# Patient Record
Sex: FEMALE | Race: BLACK OR AFRICAN AMERICAN | NOT HISPANIC OR LATINO | URBAN - METROPOLITAN AREA
[De-identification: names, ages, dates, MRNs, and addresses within clinical notes are randomized per-mention and may not be internally consistent; named-entity substitution may affect disease eponyms.]

---

## 2017-01-24 NOTE — PATIENT DISCUSSION
1.  Dermatochalasis OU:  Patient currently asymptomatic. Observe. 2. ARMD OU dry - Importance of smoking cessation blood pressure control and healthy diet were emphasized. In accordance with the AREDS study a good multivitamin containing EC and Zinc were recommened to be taken daily. Patient was instructed to self monitor their monocular vision (reading/Amsler Grid) at least weekly. Patient should immediately report any new onset of decreased vision or metamorphopsia. Continue Preservision 2 tabs/day. 3. Return for an appointment in 1 year for comprehensive exam. OCT Macula. with Dr. Giuliana Pond.

## 2017-08-02 ENCOUNTER — IMPORTED ENCOUNTER (OUTPATIENT)
Dept: URBAN - METROPOLITAN AREA CLINIC 9 | Facility: CLINIC | Age: 52
End: 2017-08-02

## 2018-01-29 NOTE — PATIENT DISCUSSION
1.  ARMD OU dry - Importance of smoking cessation blood pressure control and healthy diet were emphasized. In accordance with the AREDS study a good multivitamin containing EC and Zinc were recommened to be taken daily. Patient was instructed to self monitor their monocular vision (reading/Amsler Grid) at least weekly. Patient should immediately report any new onset of decreased vision or metamorphopsia. 2. Pseudophakia OU - IOLs stable. Monitor. 3. No glasses change. 4.  Return for an appointment in 1 year for comprehensive exam. Fundus Photo Macula. with Dr. Chari Giron.

## 2018-09-05 ENCOUNTER — IMPORTED ENCOUNTER (OUTPATIENT)
Dept: URBAN - METROPOLITAN AREA CLINIC 9 | Facility: CLINIC | Age: 53
End: 2018-09-05

## 2018-11-07 ENCOUNTER — IMPORTED ENCOUNTER (OUTPATIENT)
Dept: URBAN - METROPOLITAN AREA CLINIC 9 | Facility: CLINIC | Age: 53
End: 2018-11-07

## 2018-11-20 ENCOUNTER — IMPORTED ENCOUNTER (OUTPATIENT)
Dept: URBAN - METROPOLITAN AREA CLINIC 9 | Facility: CLINIC | Age: 53
End: 2018-11-20

## 2019-02-04 NOTE — PATIENT DISCUSSION
1.  ARMD OU dry - stable follow up with Dr. Anitra Carmichael. 2. Pseudophakia OU - IOLs stable. Monitor. 3. PVD OU:  Patient was cautioned to call our office immediately if they experience a substantial change in their symptoms such as an increase in floaters persistent flashes loss of visual field (may appear as a shadow or a curtain) or decrease in visual acuity as these may indicate a retinal tear or detachment. If this is a new problem patient will need to return for re-examination  as determined by the [address] [place] [address]. Refractive error - Glasses change optional. 5.  Return for an appointment in 1 year for comprehensive exam. and Optos with Dr. Dario Anthony.

## 2019-09-23 ENCOUNTER — IMPORTED ENCOUNTER (OUTPATIENT)
Dept: URBAN - METROPOLITAN AREA CLINIC 9 | Facility: CLINIC | Age: 54
End: 2019-09-23

## 2019-10-22 NOTE — PATIENT DISCUSSION
If no improvement after 2 weeks, Patient instructed to see DR Darryn Alvarenga for further evaluation.

## 2020-01-06 ENCOUNTER — IMPORTED ENCOUNTER (OUTPATIENT)
Dept: URBAN - METROPOLITAN AREA CLINIC 9 | Facility: CLINIC | Age: 55
End: 2020-01-06

## 2020-02-04 NOTE — PATIENT DISCUSSION
1.  ARMD OU dry - stable follow up with Dr. Sinai Serna as scheduled. 2. Pseudophakia OU - IOLs stable. Monitor. 3. PVD OU:  monitor. 4. Refractive error - Glasses change optional. 5.  Return for an appointment in 1 year for comprehensive exam and Optos with Dr. Giuliana Pond.

## 2020-09-21 ENCOUNTER — IMPORTED ENCOUNTER (OUTPATIENT)
Dept: URBAN - METROPOLITAN AREA CLINIC 9 | Facility: CLINIC | Age: 55
End: 2020-09-21

## 2021-02-03 ENCOUNTER — IMPORTED ENCOUNTER (OUTPATIENT)
Dept: URBAN - METROPOLITAN AREA CLINIC 9 | Facility: CLINIC | Age: 56
End: 2021-02-03

## 2021-02-09 NOTE — PATIENT DISCUSSION
1.  ARMD OU dry - stable follow up with Dr. Yesy Burris as scheduled. 2. Pseudophakia OU - IOLs stable. Monitor. 3. PVD OU:  monitor. 4. Refractive error - Glasses change optional. 5.  Return for an appointment in 1 year for comprehensive exam and Optos with Dr. Omer Vasquez.

## 2021-09-15 ENCOUNTER — IMPORTED ENCOUNTER (OUTPATIENT)
Dept: URBAN - METROPOLITAN AREA CLINIC 9 | Facility: CLINIC | Age: 56
End: 2021-09-15

## 2021-09-15 PROBLEM — H34.8322: Noted: 2021-09-15

## 2021-10-17 ASSESSMENT — TONOMETRY
OD_IOP_MMHG: 15
OD_IOP_MMHG: 12
OS_IOP_MMHG: 16
OD_IOP_MMHG: 13
OD_IOP_MMHG: 12
OS_IOP_MMHG: 10
OS_IOP_MMHG: 12
OS_IOP_MMHG: 13
OS_IOP_MMHG: 19
OS_IOP_MMHG: 9
OS_IOP_MMHG: 15
OD_IOP_MMHG: 18
OD_IOP_MMHG: 9
OS_IOP_MMHG: 16
OS_IOP_MMHG: 12
OD_IOP_MMHG: 12
OD_IOP_MMHG: 12

## 2021-10-17 ASSESSMENT — KERATOMETRY
OD_K1POWER_DIOPTERS: 44
OD_AXISANGLE2_DEGREES: 90
OS_AXISANGLE_DEGREES: 1
OD_K2POWER_DIOPTERS: 45.5
OS_AXISANGLE_DEGREES: 5
OD_K2POWER_DIOPTERS: 45.5
OS_AXISANGLE_DEGREES: 2
OD_AXISANGLE_DEGREES: 177
OD_AXISANGLE_DEGREES: 180
OS_K1POWER_DIOPTERS: 43.5
OS_K1POWER_DIOPTERS: 43.5
OS_K2POWER_DIOPTERS: 45.25
OD_K1POWER_DIOPTERS: 43.75
OS_AXISANGLE2_DEGREES: 92
OD_AXISANGLE_DEGREES: 7
OS_AXISANGLE2_DEGREES: 91
OD_AXISANGLE2_DEGREES: 87
OS_K2POWER_DIOPTERS: 45.5
OD_K1POWER_DIOPTERS: 44
OD_K2POWER_DIOPTERS: 45.75
OD_AXISANGLE2_DEGREES: 97
OS_AXISANGLE2_DEGREES: 95
OS_K1POWER_DIOPTERS: 43.5
OS_K2POWER_DIOPTERS: 45.25

## 2021-10-17 ASSESSMENT — VISUAL ACUITY
OS_CC: 20/30 SN
OS_CC: 20/40 SN
OS_SC: 20/25 - SN
OD_SC: 20/30 -2 SN
OD_CC: 20/25 SN
OS_CC: 20/25 - SN
OS_CC: 20/20 SN
OD_CC: 20/20 SN
OS_CC: 20/20 SN
OS_CC: 20/40 SN
OD_CC: 20/20 -2 SN
OS_CC: 20/20 - SN
OS_CC: 20/25 -2 SN
OD_CC: 20/20 -2 SN
OD_CC: 20/20 SN
OS_CC: 20/25 - SN
OS_CC: 20/20 - SN
OD_CC: 20/20 -2 SN
OD_CC: 20/20 -2 SN
OD_CC: 20/20 - SN
OD_CC: 20/20 SN
OS_CC: 20/20 SN
OD_CC: 20/20 SN
OS_CC: 20/60 SN
OD_CC: 20/20 - SN
OS_CC: 20/40 -2 SN

## 2021-11-15 NOTE — PATIENT DISCUSSION
Reviewed that they tend to [address] over a few weeks, and then may not happen for many years. Patient instructed to return if they do not clear over a few weeks for further evaluation.

## 2021-11-15 NOTE — PATIENT DISCUSSION
If no improvement after 2 weeks, Patient instructed to see DR Pramod Argueta for further evaluation.

## 2022-02-15 NOTE — PATIENT DISCUSSION
My findings and recommendations are based on patient's symptoms, eye exam, diagnostic testing, and records. oral

## 2022-02-15 NOTE — PATIENT DISCUSSION
"Request for medication refill: estradiol (ESTRACE) 1 MG tablet    Providers if patient needs an appointment and you are willing to give a one month supply please refill for one month and  send a letter/MyChart using \".SMILLIMITEDREFILL\" .smillimited and route chart to \"P [facility] \" (Giving one month refill in non controlled medications is strongly recommended before denial)    If refill has been denied, meaning absolutely no refills without visit, please complete the smart phrase \".smirxrefuse\" and route it to the \"P SMI MED REFILLS\"  pool to inform the patient and the pharmacy.    Kait Betts        " 11 15 21 PT HAS NOT HAD IN A WHILE.

## 2022-02-15 NOTE — PATIENT DISCUSSION
If no improvement after 2 weeks, Patient instructed to see DR Gerber Betancourt for further evaluation.

## 2022-02-17 ENCOUNTER — ESTABLISHED PATIENT (OUTPATIENT)
Dept: URBAN - METROPOLITAN AREA CLINIC 4 | Facility: CLINIC | Age: 57
End: 2022-02-17

## 2022-02-17 DIAGNOSIS — H04.123: ICD-10-CM

## 2022-02-17 PROCEDURE — 99213 OFFICE O/P EST LOW 20 MIN: CPT

## 2022-02-17 ASSESSMENT — TONOMETRY
OD_IOP_MMHG: 20
OS_IOP_MMHG: 19

## 2022-02-17 ASSESSMENT — VISUAL ACUITY
OS_SC: 20/20-2
OD_SC: 20/25

## 2022-03-28 NOTE — PATIENT DISCUSSION
1.  ARMD OU dry - stable follow up with Dr. Diana Pan as scheduled. 2. Pseudophakia OU - IOLs stable. Monitor. 3. PVD OU:  monitor. 4. Refractive error - Glasses change optional. 5.  Return for an appointment in 1 year for comprehensive exam and Optos with Dr. Rosa Sullivan.

## 2022-03-28 NOTE — PATIENT DISCUSSION
1.  Ophthalmic Migraine - Discussed opthalmic migraine auras and possibilty of avoiding triggers. Continue to follow with primary medical provider. 2. ARMD OU dry - stable follow up with Dr. Samantha Fritz as scheduled. 3. Pseudophakia OU - IOLs stable. Monitor. 4. PVD OU:  monitor. 5. Refractive error - Glasses change optional. 6.  Return for an appointment in 1 year for comprehensive exam and Optos with Dr. Erik Johnson.

## 2022-05-24 NOTE — PATIENT DISCUSSION
Reviewed that they tend to [address] over a few weeks, and then may not happen for many years. Patient instructed to return if they do not clear over a few weeks for further evaluation.

## 2022-06-06 ENCOUNTER — ESTABLISHED PATIENT (OUTPATIENT)
Dept: URBAN - METROPOLITAN AREA CLINIC 4 | Facility: CLINIC | Age: 57
End: 2022-06-06

## 2022-06-06 DIAGNOSIS — H04.123: ICD-10-CM

## 2022-06-06 DIAGNOSIS — H25.11: ICD-10-CM

## 2022-06-06 PROCEDURE — 92015 DETERMINE REFRACTIVE STATE: CPT

## 2022-06-06 PROCEDURE — 92014 COMPRE OPH EXAM EST PT 1/>: CPT

## 2022-06-06 ASSESSMENT — VISUAL ACUITY
OD_CC: 20/20
OS_CC: 20/20-1
OU_CC: 20/20-1
OS_GLARE: 20/40-2
OD_GLARE: 20/40-1

## 2022-06-06 ASSESSMENT — TONOMETRY
OD_IOP_MMHG: 14
OS_IOP_MMHG: 14

## 2022-06-06 ASSESSMENT — KERATOMETRY
OD_K2POWER_DIOPTERS: 45.50
OS_K2POWER_DIOPTERS: 45.50
OS_AXISANGLE2_DEGREES: 95
OS_K1POWER_DIOPTERS: 43.50
OD_AXISANGLE_DEGREES: 003
OS_AXISANGLE_DEGREES: 005
OD_AXISANGLE2_DEGREES: 93
OD_K1POWER_DIOPTERS: 44.00

## 2022-07-04 RX ORDER — AMITRIPTYLINE HYDROCHLORIDE 10 MG/1
TABLET, FILM COATED ORAL
COMMUNITY

## 2022-11-15 NOTE — PATIENT DISCUSSION
Reviewed that they tend to [address] over a few weeks, and then may not happen for many years. Patient instructed to return if they do not clear over a few weeks for further evaluation.

## 2023-05-31 ENCOUNTER — ESTABLISHED PATIENT (OUTPATIENT)
Dept: URBAN - METROPOLITAN AREA CLINIC 4 | Facility: CLINIC | Age: 58
End: 2023-05-31

## 2023-05-31 ASSESSMENT — TONOMETRY
OD_IOP_MMHG: 16
OS_IOP_MMHG: 19

## 2023-05-31 ASSESSMENT — VISUAL ACUITY
OD_CC: 20/20-2
OS_CC: 20/20-1

## 2024-04-08 ENCOUNTER — ESTABLISHED PATIENT (OUTPATIENT)
Facility: LOCATION | Age: 59
End: 2024-04-08

## 2024-04-08 DIAGNOSIS — H25.11: ICD-10-CM

## 2024-04-08 DIAGNOSIS — H50.10: ICD-10-CM

## 2024-04-08 DIAGNOSIS — H34.8322: ICD-10-CM

## 2024-04-08 DIAGNOSIS — H04.123: ICD-10-CM

## 2024-04-08 DIAGNOSIS — Z96.1: ICD-10-CM

## 2024-04-08 DIAGNOSIS — H35.22: ICD-10-CM

## 2024-04-08 PROCEDURE — 92015 DETERMINE REFRACTIVE STATE: CPT

## 2024-04-08 PROCEDURE — 92014 COMPRE OPH EXAM EST PT 1/>: CPT

## 2024-04-08 PROCEDURE — 92134 CPTRZ OPH DX IMG PST SGM RTA: CPT

## 2024-04-08 ASSESSMENT — VISUAL ACUITY
OD_GLARE: 20/50
OD_CC: 20/20-1
OU_CC: 20/20
OS_CC: 20/20-1
OS_GLARE: 20/50

## 2024-04-08 ASSESSMENT — TONOMETRY
OD_IOP_MMHG: 16
OS_IOP_MMHG: 16